# Patient Record
Sex: MALE | Race: WHITE | Employment: OTHER | URBAN - METROPOLITAN AREA
[De-identification: names, ages, dates, MRNs, and addresses within clinical notes are randomized per-mention and may not be internally consistent; named-entity substitution may affect disease eponyms.]

---

## 2017-01-13 ENCOUNTER — LAB ENCOUNTER (OUTPATIENT)
Dept: LAB | Age: 82
End: 2017-01-13
Attending: INTERNAL MEDICINE
Payer: MEDICARE

## 2017-01-13 DIAGNOSIS — R41.3 MEMORY LOSS: ICD-10-CM

## 2017-01-13 LAB
ALBUMIN SERPL BCP-MCNC: 3.8 G/DL (ref 3.5–4.8)
ALBUMIN/GLOB SERPL: 1.3 {RATIO} (ref 1–2)
ALP SERPL-CCNC: 79 U/L (ref 32–100)
ALT SERPL-CCNC: 13 U/L (ref 17–63)
ANION GAP SERPL CALC-SCNC: 7 MMOL/L (ref 0–18)
AST SERPL-CCNC: 14 U/L (ref 15–41)
BASOPHILS # BLD: 0.1 K/UL (ref 0–0.2)
BASOPHILS NFR BLD: 1 %
BILIRUB SERPL-MCNC: 0.7 MG/DL (ref 0.3–1.2)
BUN SERPL-MCNC: 40 MG/DL (ref 8–20)
BUN/CREAT SERPL: 16.6 (ref 10–20)
CALCIUM SERPL-MCNC: 9.1 MG/DL (ref 8.5–10.5)
CHLORIDE SERPL-SCNC: 108 MMOL/L (ref 95–110)
CO2 SERPL-SCNC: 25 MMOL/L (ref 22–32)
CREAT SERPL-MCNC: 2.41 MG/DL (ref 0.5–1.5)
EOSINOPHIL # BLD: 0.3 K/UL (ref 0–0.7)
EOSINOPHIL NFR BLD: 3 %
ERYTHROCYTE [DISTWIDTH] IN BLOOD BY AUTOMATED COUNT: 14.8 % (ref 11–15)
GLOBULIN PLAS-MCNC: 2.9 G/DL (ref 2.5–3.7)
GLUCOSE SERPL-MCNC: 99 MG/DL (ref 70–99)
HCT VFR BLD AUTO: 40.8 % (ref 41–52)
HGB BLD-MCNC: 13.2 G/DL (ref 13.5–17.5)
LYMPHOCYTES # BLD: 1.5 K/UL (ref 1–4)
LYMPHOCYTES NFR BLD: 18 %
MCH RBC QN AUTO: 29 PG (ref 27–32)
MCHC RBC AUTO-ENTMCNC: 32.4 G/DL (ref 32–37)
MCV RBC AUTO: 89.6 FL (ref 80–100)
MONOCYTES # BLD: 0.4 K/UL (ref 0–1)
MONOCYTES NFR BLD: 5 %
NEUTROPHILS # BLD AUTO: 6.2 K/UL (ref 1.8–7.7)
NEUTROPHILS NFR BLD: 73 %
OSMOLALITY UR CALC.SUM OF ELEC: 300 MOSM/KG (ref 275–295)
PLATELET # BLD AUTO: 199 K/UL (ref 140–400)
PMV BLD AUTO: 11 FL (ref 7.4–10.3)
POTASSIUM SERPL-SCNC: 4.2 MMOL/L (ref 3.3–5.1)
PROT SERPL-MCNC: 6.7 G/DL (ref 5.9–8.4)
RBC # BLD AUTO: 4.55 M/UL (ref 4.5–5.9)
SODIUM SERPL-SCNC: 140 MMOL/L (ref 136–144)
TSH SERPL-ACNC: 1.88 UIU/ML (ref 0.34–5.6)
VIT B12 SERPL-MCNC: 257 PG/ML (ref 181–914)
WBC # BLD AUTO: 8.5 K/UL (ref 4–11)

## 2017-01-13 PROCEDURE — 85025 COMPLETE CBC W/AUTO DIFF WBC: CPT

## 2017-01-13 PROCEDURE — 84443 ASSAY THYROID STIM HORMONE: CPT

## 2017-01-13 PROCEDURE — 82607 VITAMIN B-12: CPT

## 2017-01-13 PROCEDURE — 80053 COMPREHEN METABOLIC PANEL: CPT

## 2017-01-13 PROCEDURE — 36415 COLL VENOUS BLD VENIPUNCTURE: CPT

## 2017-01-19 ENCOUNTER — TELEPHONE (OUTPATIENT)
Dept: INTERNAL MEDICINE CLINIC | Facility: CLINIC | Age: 82
End: 2017-01-19

## 2017-01-19 DIAGNOSIS — N28.9 FUNCTION KIDNEY DECREASED: Primary | ICD-10-CM

## 2017-01-19 NOTE — TELEPHONE ENCOUNTER
Call got disconnected, unable to get through at this time. RN f/u tomorrow.  Renal US entered in epic

## 2017-01-19 NOTE — TELEPHONE ENCOUNTER
----- Message from Latonya Kearney MD sent at 1/14/2017  2:05 PM CST -----  Okay except kidney function markedly deteriorated since last time. Need renal ultrasound. Further follow-up based on results of testing.

## 2017-01-21 RX ORDER — ATORVASTATIN CALCIUM 20 MG/1
TABLET, FILM COATED ORAL
Qty: 90 TABLET | Refills: 0 | Status: SHIPPED | OUTPATIENT
Start: 2017-01-21 | End: 2017-04-20

## 2017-01-21 NOTE — TELEPHONE ENCOUNTER
Please advise on refill request    No lipid panel.     Cholesterol Medications  Protocol Criteria:  · Appointment scheduled in the past 12 months or in the next 3 months  · ALT & LDL on file in the past 12 months  · ALT result < 80  · LDL result <130   Rece

## 2017-01-23 NOTE — TELEPHONE ENCOUNTER
Pharmacy      [facility][id number] - [address] [phone], [phone]       Medication Detail         Disp Refills Start End        Atorvastatin Calcium 20 MG Oral Tab 90 tablet 0 1/21/2017       Sig: TAKE 1 TABLET (20MG)

## 2017-01-23 NOTE — TELEPHONE ENCOUNTER
Advised patient of Dr. Jeremías Porter note. Patient verbalized understanding. Number to scheduling given to patient to schedule ultrasound.

## 2017-01-30 ENCOUNTER — TELEPHONE (OUTPATIENT)
Dept: INTERNAL MEDICINE CLINIC | Facility: CLINIC | Age: 82
End: 2017-01-30

## 2017-01-30 RX ORDER — LISINOPRIL 10 MG/1
TABLET ORAL
Qty: 90 TABLET | Refills: 0 | Status: SHIPPED | OUTPATIENT
Start: 2017-01-30 | End: 2017-05-01

## 2017-01-30 NOTE — TELEPHONE ENCOUNTER
Hypertensive Medications: Medication refilled for 90 days per protocol.   Abnormal result reviewed with patient by PCP    Protocol Criteria:  · Appointment scheduled in the past 6 months or in the next 3 months  · BMP or CMP in the past 12 months  · Creatin

## 2017-01-30 NOTE — TELEPHONE ENCOUNTER
Son calling regarding medication lisinopril (PRINIVIL,ZESTRIL) 10 MG Oral Tab he state that he put in  The request two weeks ago and pharmacy has advised that they have sent it. Son is upset . Yanni Master Yanni Amos please advise pt is out of medication       Current Outpatien

## 2017-02-02 ENCOUNTER — HOSPITAL ENCOUNTER (OUTPATIENT)
Dept: ULTRASOUND IMAGING | Age: 82
Discharge: HOME | End: 2017-02-02
Attending: INTERNAL MEDICINE
Payer: MEDICARE

## 2017-02-02 DIAGNOSIS — N28.9 FUNCTION KIDNEY DECREASED: ICD-10-CM

## 2017-02-02 PROCEDURE — 76770 US EXAM ABDO BACK WALL COMP: CPT

## 2017-02-03 ENCOUNTER — TELEPHONE (OUTPATIENT)
Dept: INTERNAL MEDICINE CLINIC | Facility: CLINIC | Age: 82
End: 2017-02-03

## 2017-02-03 NOTE — TELEPHONE ENCOUNTER
Notes Recorded by Cathleen Herrera RN on 2/3/2017 at 10:52 AM  Called pt to discuss the renal ultrasound results.  [facility]    ----- Message from Fausto Rivers MD sent at 2/2/2017  1:55 PM CST -----  Renal ultrasound basically ok

## 2017-02-04 NOTE — TELEPHONE ENCOUNTER
Spoke with patient (identified name and ), results reviewed and agrees with plan. Phone number given for Dr. Imelda Bautista.

## 2017-02-17 ENCOUNTER — TELEPHONE (OUTPATIENT)
Dept: NEUROLOGY | Facility: CLINIC | Age: 82
End: 2017-02-17

## 2017-02-21 NOTE — TELEPHONE ENCOUNTER
Spoke with patient and he stated that he would have to call us back to reschedule after looking at his calendar but understood that we have to cancel his appointment for 02/22/2017 with Dr Geoffrey Stephens, patient thanked me for my call

## 2017-03-22 ENCOUNTER — TELEPHONE (OUTPATIENT)
Dept: NEUROLOGY | Facility: CLINIC | Age: 82
End: 2017-03-22

## 2017-03-23 ENCOUNTER — OFFICE VISIT (OUTPATIENT)
Dept: NEUROLOGY | Facility: CLINIC | Age: 82
End: 2017-03-23

## 2017-03-23 VITALS
SYSTOLIC BLOOD PRESSURE: 118 MMHG | OXYGEN SATURATION: 97 % | HEART RATE: 69 BPM | BODY MASS INDEX: 27.58 KG/M2 | DIASTOLIC BLOOD PRESSURE: 74 MMHG | WEIGHT: 197 LBS | HEIGHT: 71 IN

## 2017-03-23 DIAGNOSIS — G30.1 LATE ONSET ALZHEIMER'S DISEASE WITHOUT BEHAVIORAL DISTURBANCE (HCC): ICD-10-CM

## 2017-03-23 DIAGNOSIS — F02.80 LATE ONSET ALZHEIMER'S DISEASE WITHOUT BEHAVIORAL DISTURBANCE (HCC): ICD-10-CM

## 2017-03-23 DIAGNOSIS — G93.40 ENCEPHALOPATHY: Primary | ICD-10-CM

## 2017-03-23 PROCEDURE — 99204 OFFICE O/P NEW MOD 45 MIN: CPT | Performed by: OTHER

## 2017-03-23 RX ORDER — DONEPEZIL HYDROCHLORIDE 5 MG/1
5 TABLET, FILM COATED ORAL NIGHTLY
Qty: 30 TABLET | Refills: 3 | Status: SHIPPED | OUTPATIENT
Start: 2017-03-23 | End: 2017-10-26

## 2017-03-23 NOTE — PATIENT INSTRUCTIONS
Refill policies:    • Allow 2 business days for refills; controlled substances may take longer. • Contact our office at least 5 days prior to running out of medication or submit request through the “request refill” option in your [employer] account.   • Ref have a procedure or additional testing performed. [facility]) will contact your insurance carrier to obtain pre-certification or prior authorization.     Unfortunately, [facility] has seen an increase in denial of payment even though the p

## 2017-03-23 NOTE — PROGRESS NOTES
Michael Leung : 2/10/1931     HPI:   Patient presents with:  Memory Loss: Son and Daughter accompanies patient. New patient referred by . Pt c/o lack of short-term memory. Sx started about 7 years ago,but has worsen within the last 3-2 years.  Ct furosemide 40 MG Oral Tab Take 1 tablet (40 mg total) by mouth daily. Disp: 30 tablet Rfl: 2   ClonazePAM 0.5 MG Oral Tab Take 1 tablet (0.5 mg total) by mouth 2 (two) times daily as needed for Anxiety.  Disp: 20 tablet Rfl: 0   acetaminophen (TYLENOL) 32 118/74 mmHg  Pulse 69  Ht 71\"  Wt 197 lb  BMI 27.49 kg/m2  SpO2 97% . Blood pressure is equal in both arms. General appearance: In no distress. Good range of motion of his neck. CV: No  Evidence of Carotid Bruits, Regular Rate and Rhythm.    Respirator possibility there also is a vascular component to his dementia. Recent thyroid and B12 studies were both normal.    I discussed the differential diagnosis with the patient, his son, and daughter.   I recommended adding 81 mg aspirin to his statins, for str

## 2017-04-20 RX ORDER — ATORVASTATIN CALCIUM 20 MG/1
TABLET, FILM COATED ORAL
Qty: 90 TABLET | Refills: 0 | Status: SHIPPED | OUTPATIENT
Start: 2017-04-20 | End: 2017-07-31

## 2017-04-20 NOTE — TELEPHONE ENCOUNTER
Dr. Alfredo Fajardo please advise. 90 day med pended for your review.  Cannot refill because no LDL on file

## 2017-05-01 RX ORDER — LISINOPRIL 10 MG/1
TABLET ORAL
Qty: 90 TABLET | Refills: 0 | Status: SHIPPED | OUTPATIENT
Start: 2017-05-01 | End: 2017-07-31

## 2017-06-19 RX ORDER — FUROSEMIDE 40 MG/1
TABLET ORAL
Qty: 30 TABLET | Refills: 1 | Status: SHIPPED | OUTPATIENT
Start: 2017-06-19 | End: 2017-08-15

## 2017-07-31 RX ORDER — LISINOPRIL 10 MG/1
TABLET ORAL
Qty: 90 TABLET | Refills: 0 | Status: SHIPPED | OUTPATIENT
Start: 2017-07-31 | End: 2017-10-31

## 2017-07-31 RX ORDER — ATORVASTATIN CALCIUM 20 MG/1
TABLET, FILM COATED ORAL
Qty: 90 TABLET | Refills: 0 | Status: SHIPPED | OUTPATIENT
Start: 2017-07-31 | End: 2017-10-31

## 2017-08-15 ENCOUNTER — TELEPHONE (OUTPATIENT)
Dept: INTERNAL MEDICINE CLINIC | Facility: CLINIC | Age: 82
End: 2017-08-15

## 2017-08-15 RX ORDER — FUROSEMIDE 40 MG/1
TABLET ORAL
Qty: 30 TABLET | Refills: 0 | Status: SHIPPED | OUTPATIENT
Start: 2017-08-15 | End: 2019-03-25

## 2017-08-15 NOTE — TELEPHONE ENCOUNTER
Per pharmacy they don't have FUROSEMIDE 40 mg and would like to know if they can issue 20mg 2tabs a day ?

## 2017-08-17 NOTE — TELEPHONE ENCOUNTER
Kaitlin pharmacist ok to dispense 20mg furosemide to take 2 tablets once daily, since they did not have the Furosemide 40mg in stock, but advised to clearly explain to patient when he picks up the medication, so he understands.  Pharmacist verbal

## 2017-08-21 RX ORDER — FUROSEMIDE 40 MG/1
TABLET ORAL
Qty: 30 TABLET | Refills: 0 | Status: SHIPPED | OUTPATIENT
Start: 2017-08-21 | End: 2017-10-23

## 2017-08-21 RX ORDER — IPRATROPIUM/ALBUTEROL SULFATE 20-100 MCG
MIST INHALER (GRAM) INHALATION
Qty: 12 G | Refills: 0 | Status: SHIPPED | OUTPATIENT
Start: 2017-08-21 | End: 2017-08-21

## 2017-08-21 RX ORDER — IPRATROPIUM/ALBUTEROL SULFATE 20-100 MCG
MIST INHALER (GRAM) INHALATION
Qty: 12 G | Refills: 0 | Status: SHIPPED | OUTPATIENT
Start: 2017-08-21

## 2017-10-23 RX ORDER — FUROSEMIDE 40 MG/1
TABLET ORAL
Qty: 30 TABLET | Refills: 2 | Status: SHIPPED | OUTPATIENT
Start: 2017-10-23 | End: 2017-10-26

## 2017-10-23 NOTE — TELEPHONE ENCOUNTER
Please advise on refill request.  Any lab orders? Message left for patient to call back to schedule an appointment with the doctor.     Hypertensive Medications  Protocol Criteria:  · Appointment scheduled in the past 6 months or in the next 3 months  · BM

## 2017-10-26 ENCOUNTER — OFFICE VISIT (OUTPATIENT)
Dept: INTERNAL MEDICINE CLINIC | Facility: CLINIC | Age: 82
End: 2017-10-26

## 2017-10-26 VITALS
WEIGHT: 185.19 LBS | SYSTOLIC BLOOD PRESSURE: 135 MMHG | HEART RATE: 76 BPM | BODY MASS INDEX: 26 KG/M2 | DIASTOLIC BLOOD PRESSURE: 76 MMHG

## 2017-10-26 DIAGNOSIS — F03.90 DEMENTIA WITHOUT BEHAVIORAL DISTURBANCE, UNSPECIFIED DEMENTIA TYPE (HCC): ICD-10-CM

## 2017-10-26 DIAGNOSIS — F41.9 ANXIETY: ICD-10-CM

## 2017-10-26 DIAGNOSIS — I50.9 CHRONIC HEART FAILURE, UNSPECIFIED HEART FAILURE TYPE (HCC): Primary | ICD-10-CM

## 2017-10-26 PROCEDURE — 99214 OFFICE O/P EST MOD 30 MIN: CPT | Performed by: INTERNAL MEDICINE

## 2017-10-26 PROCEDURE — G0463 HOSPITAL OUTPT CLINIC VISIT: HCPCS | Performed by: INTERNAL MEDICINE

## 2017-10-26 RX ORDER — MIRTAZAPINE 15 MG/1
15 TABLET, FILM COATED ORAL NIGHTLY
Qty: 90 TABLET | Refills: 1 | Status: SHIPPED | OUTPATIENT
Start: 2017-10-26

## 2017-10-26 RX ORDER — ALBUTEROL SULFATE 90 UG/1
2 AEROSOL, METERED RESPIRATORY (INHALATION) EVERY 4 HOURS PRN
Qty: 1 INHALER | Refills: 6 | Status: SHIPPED | OUTPATIENT
Start: 2017-10-26 | End: 2018-10-16

## 2017-10-26 NOTE — PROGRESS NOTES
HPI:    Patient ID: Davion Stanley is a 80year old male. Heart Problem   This is a chronic problem. The current episode started more than 1 year ago. The problem occurs daily. The problem has been unchanged.  Pertinent negatives include no chest pain, ch MG Oral Tab Take 1 tablet (15 mg total) by mouth nightly. Disp: 90 tablet Rfl: 1   Albuterol Sulfate HFA (PROAIR HFA) 108 (90 Base) MCG/ACT Inhalation Aero Soln Inhale 2 puffs into the lungs every 4 (four) hours as needed for Wheezing.  Disp: 1 Inhaler Rfl: alert.   Skin: Skin is dry. Psychiatric: His behavior is normal.   Nursing note and vitals reviewed. 10/26/17  1502   BP: 135/76   Pulse: 76   Weight: 185 lb 3.2 oz (84 kg)         Body mass index is 25.83 kg/m².     Diabetes:  No results found for: Electronically Signed: Nay Solis, 10/26/2017, 2:53 PM.    TU ARTEAGA MD,  personally performed the services described in this documentation. All medical record entries made by the scribe were at my direction and in my presence.   I have r

## 2017-10-31 RX ORDER — LISINOPRIL 10 MG/1
TABLET ORAL
Qty: 90 TABLET | Refills: 0 | Status: SHIPPED | OUTPATIENT
Start: 2017-10-31 | End: 2018-01-29

## 2017-10-31 RX ORDER — ATORVASTATIN CALCIUM 20 MG/1
TABLET, FILM COATED ORAL
Qty: 90 TABLET | Refills: 0 | Status: SHIPPED | OUTPATIENT
Start: 2017-10-31 | End: 2018-01-29

## 2018-01-19 RX ORDER — FUROSEMIDE 40 MG/1
TABLET ORAL
Qty: 30 TABLET | Refills: 1 | Status: SHIPPED | OUTPATIENT
Start: 2018-01-19 | End: 2018-03-22

## 2018-01-29 RX ORDER — LISINOPRIL 10 MG/1
TABLET ORAL
Qty: 90 TABLET | Refills: 0 | Status: SHIPPED | OUTPATIENT
Start: 2018-01-29 | End: 2018-05-01

## 2018-01-29 RX ORDER — ATORVASTATIN CALCIUM 20 MG/1
TABLET, FILM COATED ORAL
Qty: 90 TABLET | Refills: 0 | Status: SHIPPED | OUTPATIENT
Start: 2018-01-29

## 2018-03-22 RX ORDER — FUROSEMIDE 40 MG/1
TABLET ORAL
Qty: 30 TABLET | Refills: 0 | Status: SHIPPED | OUTPATIENT
Start: 2018-03-22 | End: 2018-05-01

## 2018-03-22 NOTE — TELEPHONE ENCOUNTER
REFILL FAILED PROTOCOL    Creatinine greater than 2 - 2.41    Labs over 15 months old.     LOV 10/26/17    Please advise

## 2018-03-22 NOTE — TELEPHONE ENCOUNTER
Hypertensive Medications  Protocol Criteria:  · Appointment scheduled in the past 6 months or in the next 3 months  · BMP or CMP in the past 12 months  · Creatinine result < 2  Recent Outpatient Visits            4 months ago Chronic heart failure, unspeci

## 2018-04-30 NOTE — TELEPHONE ENCOUNTER
Ramiro/son [phone] is calling for status of medication refill request. Per son pt is out of medication.

## 2018-04-30 NOTE — TELEPHONE ENCOUNTER
ivky/son [phone] is calling for status of medication refill request. Per son pt is out of medication.

## 2018-05-01 RX ORDER — LISINOPRIL 10 MG/1
TABLET ORAL
Qty: 90 TABLET | Refills: 1 | Status: SHIPPED | OUTPATIENT
Start: 2018-05-01 | End: 2018-10-23

## 2018-05-01 RX ORDER — FUROSEMIDE 40 MG/1
TABLET ORAL
Qty: 90 TABLET | Refills: 1 | Status: SHIPPED | OUTPATIENT
Start: 2018-05-01 | End: 2018-10-23

## 2018-10-16 NOTE — PROGRESS NOTES
HPI:    Patient ID: Alysa Gonzalez is a 80year old male. Pt's son and caregiver give history. Son states that they would like an order for home health nurse    Heart Problem   This is a chronic problem. The current episode started more than 1 year ago. occurs constantly. The problem has been waxing and waning. Pertinent negatives include no abdominal pain or chest pain. Treatments tried: pt's son requests DME order for walker and shower chair.    Change of Bowel Habits   The problem has been waxing and wa nightly.  Disp: 90 tablet Rfl: 1   COMBIVENT RESPIMAT  MCG/ACT Inhalation Aero Soln INHALE 1 PUFF BY INHALATION ROUTE 4 TIMES PER DAY Disp: 12 g Rfl: 0   FUROSEMIDE 40 MG Oral Tab TAKE ONE TABLET BY MOUTH ONE TIME DAILY  Disp: 30 tablet Rfl: 0   aspir Results   Component Value Date    AST 14 (L) 01/13/2017     Lab Results   Component Value Date    ALT 13 (L) 01/13/2017            ASSESSMENT/PLAN:   Dementia due to alzheimer's disease  (primary encounter diagnosis)  Chronic obstructive pulmonary disease, reviewed the chart and discharge instructions (if applicable) and agree that the record reflects my personal performance and is accurate and complete.   Charleen Arriaza MD, 10/18/2018, 1:24 PM

## 2018-10-25 RX ORDER — FUROSEMIDE 40 MG/1
TABLET ORAL
Qty: 90 TABLET | Refills: 0 | Status: SHIPPED | OUTPATIENT
Start: 2018-10-25 | End: 2019-03-25

## 2018-10-25 RX ORDER — LISINOPRIL 10 MG/1
TABLET ORAL
Qty: 90 TABLET | Refills: 0 | Status: SHIPPED | OUTPATIENT
Start: 2018-10-25 | End: 2019-01-23

## 2018-10-25 RX ORDER — FUROSEMIDE 40 MG/1
TABLET ORAL
Qty: 90 TABLET | Refills: 0 | Status: SHIPPED | OUTPATIENT
Start: 2018-10-25 | End: 2019-01-23

## 2018-10-25 RX ORDER — LISINOPRIL 10 MG/1
TABLET ORAL
Qty: 90 TABLET | Refills: 0 | Status: SHIPPED | OUTPATIENT
Start: 2018-10-25 | End: 2019-03-26

## 2018-11-08 ENCOUNTER — TELEPHONE (OUTPATIENT)
Dept: INTERNAL MEDICINE CLINIC | Facility: CLINIC | Age: 83
End: 2018-11-08

## 2018-11-08 NOTE — TELEPHONE ENCOUNTER
Per pt's son he was advised the order for [place] was sent to the wrong place. Requesting it to be sent to [facility].

## 2018-12-03 ENCOUNTER — TELEPHONE (OUTPATIENT)
Dept: OTHER | Age: 83
End: 2018-12-03

## 2018-12-03 NOTE — TELEPHONE ENCOUNTER
Spoke with son  Mimi Delgadillo (MARQUIS verified) and is upset because [address] has not yet been started for patient. \"I don't like being played around with--it has been over a month and a half since this was ordered.     He is requesting orders be sent again to Res

## 2018-12-03 NOTE — TELEPHONE ENCOUNTER
Dontetiti Ballesterosshara (son) called back. Relayed Sandras message below. Pt verbalized understanding and did not have any further questions at this time. Will just await Dr. Kenyon Vann advice regarding Trazadone from 12/3 encounter and call son back with advice.

## 2018-12-03 NOTE — TELEPHONE ENCOUNTER
Lmtcb, let pt's son know referral was refaxed to [facility] at [phone] he can go ahead and contact them if he would like Gary Acosta ([phone].  I did include for viky to be contact in order to initiate hh.

## 2018-12-03 NOTE — TELEPHONE ENCOUNTER
Spoke with son Faith Elias (MARQUIS verified)--states Trazadone is not helping patient--\"I'm asking Dr. Mimi Sherwood to increase his Trazadone to twice a day--my father hasn't been sleeping well for months. He's got severe dementia and he needs some relief. \"    Please als

## 2018-12-03 NOTE — TELEPHONE ENCOUNTER
Spoke to Byron, son. Informed ok to increase trazadone to 100mg nightly. Patient still has 50mg tablets and will call back to update if 100mg is helping him and will then ask for a new prescription.

## 2018-12-07 NOTE — TELEPHONE ENCOUNTER
Meenakshi Gomez, [facility] RN calling and states that she admitted the patient today, will need admission orders include PT/OT and speech therapist, also will need diagnosis, fax  Number [phone], advised that will send the message to the office and will fax the neede

## 2018-12-11 ENCOUNTER — TELEPHONE (OUTPATIENT)
Dept: INTERNAL MEDICINE CLINIC | Facility: CLINIC | Age: 83
End: 2018-12-11

## 2018-12-11 NOTE — TELEPHONE ENCOUNTER
[place] speech therapist called stts she just needs a verbal from Dr. Serge Corrigan to move appt for Eval to next week. Family has to many appts this week. Per Dr. Luis Ramirez verbal ok, [place] informed and voiced understanding.

## 2018-12-24 ENCOUNTER — TELEPHONE (OUTPATIENT)
Dept: OTHER | Age: 83
End: 2018-12-24

## 2018-12-24 NOTE — TELEPHONE ENCOUNTER
Desirae [address] pharmacist, [address] , calling and states that Cirilo Joel RN phoned in the prescription earlier but when he checked the OSMEL  it  keep saying INACTIVE. OSMEL number given again but states that it is the same number that he has.  Informed Stalin Holt, Supervisor about

## 2018-12-24 NOTE — TELEPHONE ENCOUNTER
Pt's son asking for another medication for pt. States Trazodone is not relieving pt's symptoms of anxiety and pt is sleeping about 4 hours a night, sometimes less.  States pt started taking Trazodone 50 mg in November, med was increased to Trazodone 50 mg 2

## 2018-12-24 NOTE — TELEPHONE ENCOUNTER
Patient's son states Trazadone 100 mg  is not helping. Patient has  more anxiety and sleeps only 4 hours nightly or less. Would like to try something else please. Needs Rx today. Please advise.

## 2018-12-24 NOTE — TELEPHONE ENCOUNTER
Patient's son contacted and message given. Rx called to pharmacy. Dr. Mimi Sherwood please sign off.

## 2018-12-24 NOTE — TELEPHONE ENCOUNTER
Contacted pharmacist;  rx went through. Left message for pateint's son that new medication was prescribed. Should be at the [place] for .

## 2018-12-26 ENCOUNTER — NURSE TRIAGE (OUTPATIENT)
Dept: INTERNAL MEDICINE CLINIC | Facility: CLINIC | Age: 83
End: 2018-12-26

## 2018-12-26 NOTE — TELEPHONE ENCOUNTER
Action Requested: Summary for Provider     []  Critical Lab, Recommendations Needed  [] Need Additional Advice  []   FYI    []   Need Orders  [x] Need Medications Sent to Pharmacy  []  Other     SUMMARY: Patient's son calling to request if patient can have

## 2018-12-27 RX ORDER — PAROXETINE 10 MG/1
10 TABLET, FILM COATED ORAL NIGHTLY
Qty: 30 TABLET | Refills: 1 | Status: SHIPPED | OUTPATIENT
Start: 2018-12-27 | End: 2019-01-08

## 2018-12-27 NOTE — TELEPHONE ENCOUNTER
Yes, Paxil is okay to take with his medication. I suggest taking Paxil evening because it can be be sedating. However, he can take it anytime during the day.

## 2018-12-27 NOTE — TELEPHONE ENCOUNTER
Called patient's son Mimi Delgadillo. Informed him of Dr. Marletta Bernheim advice. Before Rx is sent Mimi Delgadillo has a few questions for the doctor:    Is it safe to take Paxil with his current Rx of zolpidem 2.5 mg nightly?   Can pt take paxil in the mornings because patient is mos

## 2018-12-27 NOTE — TELEPHONE ENCOUNTER
Chuy. Informed him of Dr. Dubose Flick answers below. He verbalized his understanding. Rx sent as written.

## 2019-01-02 ENCOUNTER — TELEPHONE (OUTPATIENT)
Dept: INTERNAL MEDICINE CLINIC | Facility: CLINIC | Age: 84
End: 2019-01-02

## 2019-01-02 NOTE — TELEPHONE ENCOUNTER
Shirley [facility] PT stated pt declined PT services after EVAL    Shirley stated someone told him pt seems to be overwhelemed with the amount of people coming in    Please advise    Shirley/[facility] [employer]  [phone]

## 2019-01-03 ENCOUNTER — TELEPHONE (OUTPATIENT)
Dept: INTERNAL MEDICINE CLINIC | Facility: CLINIC | Age: 84
End: 2019-01-03

## 2019-01-03 RX ORDER — ZOLPIDEM TARTRATE 5 MG/1
TABLET ORAL
Qty: 30 TABLET | Refills: 0 | OUTPATIENT
Start: 2019-01-03

## 2019-01-03 NOTE — TELEPHONE ENCOUNTER
Per son of pt, he would like to talk to Dr Zulma Bautista or RN in regards to pt rx med Ambien and PARoxetine HCl (PAXIL), Son of pt stts that it's not working much with pt and would like to know if pt can double the dose. Pls advise.

## 2019-01-03 NOTE — TELEPHONE ENCOUNTER
Per Pyreos ( HIPPA verified) Pt has been up and down through out the nigth in the last 48 hrs. Would like for Dr Kendall Denver to increased dosage. Please advise.

## 2019-01-04 ENCOUNTER — TELEPHONE (OUTPATIENT)
Dept: INTERNAL MEDICINE CLINIC | Facility: CLINIC | Age: 84
End: 2019-01-04

## 2019-01-04 NOTE — TELEPHONE ENCOUNTER
Felicia Wells from [facility] calling to request a verbal order to see pt one more time next week for occupational therapy.  Please advise

## 2019-01-05 NOTE — TELEPHONE ENCOUNTER
Spoke with Shirley and informed him of Dr. Shaye Melendez note below and He voiced understanding. Shirley will contact pt again.

## 2019-01-07 ENCOUNTER — TELEPHONE (OUTPATIENT)
Dept: INTERNAL MEDICINE CLINIC | Facility: CLINIC | Age: 84
End: 2019-01-07

## 2019-01-07 NOTE — TELEPHONE ENCOUNTER
Patient's son called again very upset. Requesting response from Dr. Wandy Jules. Patient is not sleeping and is up for 24 hours per day. Has dementia. Wondering if he can increase dose of paxil and/or ambien. Would like a call back at [phone].

## 2019-01-07 NOTE — TELEPHONE ENCOUNTER
Patient's son again calling with question about sleep meds. Patient is up every night, at one time staying awake for 48 hours, he has been trying to get an answer since last week.  See note below, can he change dose of paxil and/or ambien  Please reply to pool: EM RN TRIAGE  Reason for call changed from medication question to acute

## 2019-01-08 RX ORDER — PAROXETINE HYDROCHLORIDE 20 MG/1
20 TABLET, FILM COATED ORAL NIGHTLY
Qty: 30 TABLET | Refills: 0 | Status: SHIPPED | OUTPATIENT
Start: 2019-01-08 | End: 2019-02-10

## 2019-01-08 NOTE — TELEPHONE ENCOUNTER
CSS or RN: please transfer call to me at [phone] if Adan Raymundo or [facility] calls back. I would like to speak with [facility] RN.

## 2019-01-08 NOTE — TELEPHONE ENCOUNTER
Received a call from [facility], occupational therapist from [place]  who wanted to check on the status of the request from below. Routed to provider for review.     Please respond to pool: JOSE SEYMOUR LPN/VALDEMAR

## 2019-01-08 NOTE — TELEPHONE ENCOUNTER
Incoming call from Byron, son who is upset. I gave Dr Bird Cluster directions for paxil increase. He states he has been giving patient ambien at night and it doesn't work. Patient has frequent falls because patient is too tired and doesn't sleep well at night. He felt no one is helping him. Byron also feels tired and he is not sleeping due to caring for patient. I placed a call to Cathleen Xiong, [facility] RN to inform her of increase on Paxil. I would also like to discuss Ramiro's concern regarding his falls, and sleeping habits, dementia.

## 2019-01-09 NOTE — TELEPHONE ENCOUNTER
Spoke with Dolly Thomas OT--following up on 3rd request for TCW to authorize one more OT visit for patient---needs to schedule OT visit tomorrow, as she will not be available the rest of the week.      Please advise    If you reach Dulce's voicemail--ok to l

## 2019-01-09 NOTE — TELEPHONE ENCOUNTER
Spoke with Gianna Reyes and relayed EL message below--patient verbalizes understanding and agreement. No further questions/concerns at this time.

## 2019-01-11 ENCOUNTER — NURSE TRIAGE (OUTPATIENT)
Dept: INTERNAL MEDICINE CLINIC | Facility: CLINIC | Age: 84
End: 2019-01-11

## 2019-01-11 NOTE — TELEPHONE ENCOUNTER
Pt's son is calling to inquire on the medications listed below. Son states these two medications are not working for pt. Son states they are not doing anything for pt and would like a call back to see if the dosage can be changed.        Please advise thank

## 2019-01-11 NOTE — TELEPHONE ENCOUNTER
Jason Ivan home health nurse also contacted clinic reporting the same info below. Routed to provider for advisement.

## 2019-01-11 NOTE — TELEPHONE ENCOUNTER
Attempt made to contact Doctor January Tobar at [address]; no answer LMTCB.  please note that the patient's son reported the Ambien is not helping the patient. Routed to provider.

## 2019-01-11 NOTE — TELEPHONE ENCOUNTER
Pt's son called to follow up on message below. States pt has not slept and current medications are not helping. Please advise.

## 2019-01-14 NOTE — TELEPHONE ENCOUNTER
Son called back regarding medication increase. Per son, patient is taking Paroxetine 20 mg daily and it is not working per son. Patient also taking Zolpidem 5 mg (1/2 tab) nightly and it is not helping with his sleep.      Son in requesting an increase i

## 2019-01-14 NOTE — TELEPHONE ENCOUNTER
Action Requested: Summary for Provider     []  Critical Lab, Recommendations Needed  [] Need Additional Advice  []   FYI    []   Need Orders  [] Need Medications Sent to Pharmacy  []  Other     SUMMARY:   Please advise.   The son is very upset that no one i

## 2019-01-14 NOTE — TELEPHONE ENCOUNTER
Please let the patient know that  Rather his son that is why he is able to have full 5 mg Ambien at night. Gabo Tinajero

## 2019-01-14 NOTE — TELEPHONE ENCOUNTER
LMTCB  Transfer to triage. Please see 1/3/19 acute call. Was addressed.     New Akins MD          1:25 PM   Note      On  1/3 I infrased                 paxil to 29mg and  ambien to      5    mg            Paxil was increased to 20 mg daily and Zo

## 2019-01-15 NOTE — TELEPHONE ENCOUNTER
Pt son calling stating the increase in Ambien and Paxil has not helped. Per Dr. Jose Prather note below I advised son to stop the Ambien 5mg and start the new medication Dr. Marcela Shrestha advised (Belsomra 5mg) instead. Dr. Marcela Shrestha,      Please sign pended script.

## 2019-01-23 ENCOUNTER — TELEPHONE (OUTPATIENT)
Dept: INTERNAL MEDICINE CLINIC | Facility: CLINIC | Age: 84
End: 2019-01-23

## 2019-01-23 RX ORDER — LISINOPRIL 10 MG/1
TABLET ORAL
Qty: 90 TABLET | Refills: 0 | Status: SHIPPED | OUTPATIENT
Start: 2019-01-23

## 2019-01-23 RX ORDER — FUROSEMIDE 40 MG/1
TABLET ORAL
Qty: 90 TABLET | Refills: 0 | Status: SHIPPED | OUTPATIENT
Start: 2019-01-23

## 2019-01-23 NOTE — TELEPHONE ENCOUNTER
[facility] would like to inform Dr. Melvin Salazar that they are re certifying the patient for nursing visit once a week x 8 weeks and she will fax the order for the doctor to sign.

## 2019-02-10 RX ORDER — PAROXETINE HYDROCHLORIDE 20 MG/1
20 TABLET, FILM COATED ORAL NIGHTLY
Qty: 30 TABLET | Refills: 0 | Status: SHIPPED | OUTPATIENT
Start: 2019-02-10 | End: 2019-03-25

## 2019-02-11 RX ORDER — PAROXETINE HYDROCHLORIDE 20 MG/1
20 TABLET, FILM COATED ORAL NIGHTLY
Qty: 30 TABLET | Refills: 2 | Status: SHIPPED | OUTPATIENT
Start: 2019-02-11 | End: 2019-06-01

## 2019-02-12 ENCOUNTER — TELEPHONE (OUTPATIENT)
Dept: OTHER | Age: 84
End: 2019-02-12

## 2019-02-12 DIAGNOSIS — G47.9 SLEEP DISORDER: Primary | ICD-10-CM

## 2019-02-12 NOTE — TELEPHONE ENCOUNTER
Spoke with son Ramiro--reports Paxil and Belsomra not working--\"It's causing the reverse effect on him. He becomes verbally loud and angry when I give him the [address]. The Paxil doesn't work--I give it to him, then at one in the morning, he is wide awake.

## 2019-02-12 NOTE — TELEPHONE ENCOUNTER
Refill passed per [facility] protocol.   Refill Protocol Appointment Criteria  · Appointment scheduled in the past 6 months or in the next 3 months  Recent Outpatient Visits            3 months ago Dementia due to Alzheimer's disease    [facility]

## 2019-02-13 NOTE — TELEPHONE ENCOUNTER
Spoke with pt son and MD recommendations given. Pt son verb understanding. Pt son asking for referral to be entered.      Offered to provide Dr Michela Selby office number and pt son states \"I will just look it up myself\"

## 2019-03-01 ENCOUNTER — TELEPHONE (OUTPATIENT)
Dept: INTERNAL MEDICINE CLINIC | Facility: CLINIC | Age: 84
End: 2019-03-01

## 2019-03-15 NOTE — TELEPHONE ENCOUNTER
[address] An[address] is calling  To in form Dr that pt has re started [address] on wed.      Will be faxing order to sign      [phone]

## 2019-03-19 ENCOUNTER — TELEPHONE (OUTPATIENT)
Dept: OTHER | Age: 84
End: 2019-03-19

## 2019-03-19 RX ORDER — ACETAMINOPHEN AND CODEINE PHOSPHATE 300; 30 MG/1; MG/1
1 TABLET ORAL EVERY 4 HOURS PRN
Qty: 30 TABLET | Refills: 2 | OUTPATIENT
Start: 2019-03-19

## 2019-03-19 NOTE — TELEPHONE ENCOUNTER
Patient's son following up on request for pain medication for patient. Advised of the following script sent by Dr Katty Ignacio, son verbalized understanding, Order phoned into [facility].     Acetaminophen-Codeine (TYLENOL WITH CODEINE #3) 300-30 MG

## 2019-03-19 NOTE — TELEPHONE ENCOUNTER
Dr. De La Vega Marking Pt son Donte Walter wanted to let you know that pt was at [facility] ER yesterday. Son stated that pt has dementia and he pulled out his zaman catheter  out so son took him to ER. Son stated that they did not do much in the ER.  They just cleaned him up pu

## 2019-03-20 ENCOUNTER — TELEPHONE (OUTPATIENT)
Dept: INTERNAL MEDICINE CLINIC | Facility: CLINIC | Age: 84
End: 2019-03-20

## 2019-03-20 NOTE — TELEPHONE ENCOUNTER
Spoke with Dudley Harris [phone]. Stated patient was recently in [facility] for dehydration and that the doctors there changed his laxix dose to 20 mg three times weekly. His medications with [facility] are lasix 40 mg daily.   Elidia Griffith stated his edema is i

## 2019-03-20 NOTE — TELEPHONE ENCOUNTER
Per Gabrielle/RN pt has increase his SOB, swelling of his feet and ankle. Transfer call to Dr Bard Chapman.

## 2019-03-20 NOTE — TELEPHONE ENCOUNTER
CSS reached out to [facility] nurse and on call service to have Dr. Serge mazariegos.

## 2019-03-21 ENCOUNTER — TELEPHONE (OUTPATIENT)
Dept: INTERNAL MEDICINE CLINIC | Facility: CLINIC | Age: 84
End: 2019-03-21

## 2019-03-21 NOTE — TELEPHONE ENCOUNTER
Paging    Message # [phone]         03/20/2019 04:23p   [LARRY]  To:  From:  ARABELLA Grimes MD:  Phone#:  ----------------------------------------------------------------------  Gold Kelly, [phone], [facility], [address]

## 2019-03-25 ENCOUNTER — TELEPHONE (OUTPATIENT)
Dept: INTERNAL MEDICINE CLINIC | Facility: CLINIC | Age: 84
End: 2019-03-25

## 2019-03-25 ENCOUNTER — NURSE TRIAGE (OUTPATIENT)
Dept: INTERNAL MEDICINE CLINIC | Facility: CLINIC | Age: 84
End: 2019-03-25

## 2019-03-25 NOTE — TELEPHONE ENCOUNTER
This nurse spoke with patient's son who confirmed the patient is not taking the trazodone and he has been off of the medication since November/2018. Message routed to provider to review and to also see condition update encounter dated 3/25/19.

## 2019-03-25 NOTE — TELEPHONE ENCOUNTER
Reviewed doctor's recommendations with pt's son, Mason Pa. Masno Eli states pt can't walk very well and can't walk down a flight of stairs. Pt will be starting home PT this week, not sure what day. Please advise.

## 2019-03-25 NOTE — TELEPHONE ENCOUNTER
Action Requested: Summary for Provider     []  Critical Lab, Recommendations Needed  [x] Need Additional Advice  []   FYI    []   Need Orders  [x] Need Medications Sent to Pharmacy  []  Other     SUMMARY: Patient's son calling in with concerns of severe in

## 2019-03-25 NOTE — TELEPHONE ENCOUNTER
Patient's son calling with concerns of worsening symptoms of swelling to ankles/lower legs, also causing wheezing.  Was on Furosemide 40mg QD, was hospitalized for kidney infection due to dose being too high, dose was lowered in hospital to 20mg 1 tab Mon,W

## 2019-03-25 NOTE — TELEPHONE ENCOUNTER
Spoke with patient's son who is requesting a new medication that is not Trazodone. Son reports the patient was on the Trazodone before and it did not work. Message routed to Dr. Chavo Qureshi for review.      Please reply to pool: JOSE Cuenca

## 2019-03-25 NOTE — TELEPHONE ENCOUNTER
Reviewed doctor's recommendations with pt's son, Arielle Fontana. States pt is taking Paroxetine, he is asking if pt should be taking the Paroxetine as well as the Trazodone.      Also please advise qty and refills of Trazodone as this is a new medication for pt

## 2019-03-26 RX ORDER — TRAZODONE HYDROCHLORIDE 100 MG/1
100 TABLET ORAL NIGHTLY
Qty: 30 TABLET | Refills: 0 | OUTPATIENT
Start: 2019-03-26 | End: 2019-03-26 | Stop reason: CLARIF

## 2019-03-26 RX ORDER — TRAZODONE HYDROCHLORIDE 100 MG/1
100 TABLET ORAL NIGHTLY
Qty: 30 TABLET | Refills: 0 | Status: SHIPPED | OUTPATIENT
Start: 2019-03-26 | End: 2019-04-22

## 2019-03-26 NOTE — TELEPHONE ENCOUNTER
Followed up with Seth Magana, advised of recommendation to use Trazodone and addition to current meds Paroxetine and Zolpidem, Seth Magana confirms has not tried the combination of the 3 medications, agreed to try Trazodone and continue all other medications.  Script s

## 2019-03-27 ENCOUNTER — TELEPHONE (OUTPATIENT)
Dept: OTHER | Age: 84
End: 2019-03-27

## 2019-03-27 NOTE — TELEPHONE ENCOUNTER
Incoming call from Truman Chau, son of patient. He states trazadone is not at the pharmacy. IG approved 3/26/19. Will call [facility] to confirm receipt. [facility] contacted. RX is ready for . I left message on Ramiro's VM informing him RX ready.

## 2019-03-27 NOTE — TELEPHONE ENCOUNTER
Patient son called on another medication question from previous encounter. I told him to schedule appt for patient.

## 2019-03-27 NOTE — TELEPHONE ENCOUNTER
Pt's son asked about trazodone refill. I verified with [place] in [facility] [place] that the refill is ready for  and I advised the pt's son of this.

## 2019-03-28 ENCOUNTER — TELEPHONE (OUTPATIENT)
Dept: INTERNAL MEDICINE CLINIC | Facility: CLINIC | Age: 84
End: 2019-03-28

## 2019-03-28 NOTE — TELEPHONE ENCOUNTER
Ramandeep Bray called in from [facility] requesting a verbal order to continue skilled speech therapy for 1 more week for 1 time per week.

## 2019-03-29 ENCOUNTER — LAB REQUISITION (OUTPATIENT)
Dept: LAB | Facility: HOSPITAL | Age: 84
End: 2019-03-29
Payer: MEDICARE

## 2019-03-29 DIAGNOSIS — R60.9 EDEMA: ICD-10-CM

## 2019-03-29 PROCEDURE — 80048 BASIC METABOLIC PNL TOTAL CA: CPT

## 2019-03-29 NOTE — TELEPHONE ENCOUNTER
Spoke with SOFIA AVENDANO [facility] nurse and informed of Dr. Derian Pretty note below and she voiced understanding. Topical Steroids Counseling: I discussed with the patient that prolonged use of topical steroids can result in the increased appearance of superficial blood vessels (telangiectasias), lightening (hypopigmentation) and thinning of the skin (atrophy).  Patient understands to avoid using high potency steroids in skin folds, the groin or the face.  The patient verbalized understanding of the proper use and possible adverse effects of topical steroids.  All of the patient's questions and concerns were addressed. Detail Level: Zone

## 2019-04-01 ENCOUNTER — TELEPHONE (OUTPATIENT)
Dept: INTERNAL MEDICINE CLINIC | Facility: CLINIC | Age: 84
End: 2019-04-01

## 2019-04-01 RX ORDER — HALOPERIDOL 0.5 MG/1
0.5 TABLET ORAL 4 TIMES DAILY
Qty: 30 TABLET | Refills: 5 | Status: CANCELLED | OUTPATIENT
Start: 2019-04-01

## 2019-04-01 NOTE — TELEPHONE ENCOUNTER
Ruslan kaplan [facility] ci to advise Dr. Melvin Salazar that     traZODone HCl 100 MG Oral Tab Take 1 tablet (100 mg total) by mouth nightly. Disp: 30 tablet Rfl: 0     Is not working for the patient and patient has not had sleep and has increased agitation and fall risk. Requesting to try Ativan or haldol by mouth to help with sleep.

## 2019-04-01 NOTE — TELEPHONE ENCOUNTER
Call pharmacy and pharmacist stts Haldol 0.5mg is currently on back and not sure of when they will get it  Order. They have Haldol 1mg but it's not scored to cut in half please advise.

## 2019-04-03 RX ORDER — HALOPERIDOL 1 MG/1
1 TABLET ORAL NIGHTLY
Qty: 30 TABLET | Refills: 5 | Status: SHIPPED | OUTPATIENT
Start: 2019-04-03

## 2019-04-04 ENCOUNTER — TELEPHONE (OUTPATIENT)
Dept: INTERNAL MEDICINE CLINIC | Facility: CLINIC | Age: 84
End: 2019-04-04

## 2019-04-16 ENCOUNTER — TELEPHONE (OUTPATIENT)
Dept: INTERNAL MEDICINE CLINIC | Facility: CLINIC | Age: 84
End: 2019-04-16

## 2019-04-16 NOTE — TELEPHONE ENCOUNTER
Brian Cee from [facility] called to inform dr that pt will be seen by speech therapist the week of 4/22.

## 2019-04-24 NOTE — TELEPHONE ENCOUNTER
Please review; protocol failed.     Requested Prescriptions     Pending Prescriptions Disp Refills   • TRAZODONE  MG Oral Tab [Pharmacy Med Name: Trazodone Hydrochloride 100 Mg Tab Zydu] 30 tablet 0     Sig: Take 1 tablet (100 mg total) by mouth jessika

## 2019-04-25 ENCOUNTER — TELEPHONE (OUTPATIENT)
Dept: INTERNAL MEDICINE CLINIC | Facility: CLINIC | Age: 84
End: 2019-04-25

## 2019-04-25 NOTE — TELEPHONE ENCOUNTER
Dakota Human pt's son was bathing pt and lowered him to the ground twice due to weakness fatigue. PT does not complain of injury nor heading of the head.  Please advise

## 2019-04-26 ENCOUNTER — TELEPHONE (OUTPATIENT)
Dept: INTERNAL MEDICINE CLINIC | Facility: CLINIC | Age: 84
End: 2019-04-26

## 2019-04-26 NOTE — TELEPHONE ENCOUNTER
Called pharm informed per supervisor Dr. Christina Thompson is active. They tried again and said they figured it out. No further action needed.

## 2019-04-26 NOTE — TELEPHONE ENCOUNTER
Mahendra from [facility] called and she stated she is trying to fill prescription and the PCP OSMEL is showing inactive.   She stated she has  [id number]    Please call

## 2019-04-27 RX ORDER — TRAZODONE HYDROCHLORIDE 100 MG/1
100 TABLET ORAL NIGHTLY
Qty: 90 TABLET | Refills: 1 | Status: SHIPPED | OUTPATIENT
Start: 2019-04-27

## 2019-04-30 ENCOUNTER — TELEPHONE (OUTPATIENT)
Dept: INTERNAL MEDICINE CLINIC | Facility: CLINIC | Age: 84
End: 2019-04-30

## 2019-05-01 NOTE — TELEPHONE ENCOUNTER
Jostin Venegas routed conversation to [facility] 30 minutes ago (9:28 AM)      Maria Ines Brsawell 32 minutes ago (9:27 AM)         Tried several times to contact pt but no luck, LMTCB also to [facility] pls send ltr to pt. Thank you.          Documentation

## 2019-05-01 NOTE — TELEPHONE ENCOUNTER
Tried several times to contact pt but no luck, LMTCB also to [facility] pls send ltr to pt. Thank you.

## 2019-05-01 NOTE — TELEPHONE ENCOUNTER
Dr. Anamika Mayers- Would you like us to send letter to pt to schedule appt. Per notes below phone room has called and no luck.

## 2019-05-09 ENCOUNTER — TELEPHONE (OUTPATIENT)
Dept: INTERNAL MEDICINE CLINIC | Facility: CLINIC | Age: 84
End: 2019-05-09

## 2019-05-09 NOTE — TELEPHONE ENCOUNTER
[address] nurse would like to inform Dr that pt has has a new Dr that comes to the home. Nurse was wondering if new Dr can Deborra Ale forms?     Please advise

## 2019-05-13 NOTE — TELEPHONE ENCOUNTER
Dr. Manuel Grimm per note below- pt has has a NEW Dr that comes to his home, ok if that New Dr signs hh orders?

## 2019-05-14 NOTE — TELEPHONE ENCOUNTER
for [address] her Dr. Corey More said it's fine with him if Andree Martinez Dr that goes to pt's home signs orders. Call back if you have any questions.

## 2019-06-02 NOTE — TELEPHONE ENCOUNTER
CSS please assist patient in scheduling a follow up appointment - thank you       Please review; protocol failed.     Refill Protocol Appointment Criteria  · Appointment scheduled in the past 6 months or in the next 3 months  Recent Outpatient Visits

## 2019-06-03 NOTE — TELEPHONE ENCOUNTER
See TE Naren Andrew 5/9/19, they may be referring to the doctor with [facility] who sees patient in home.      Dr Zulma Calero please advise on refill request or if this should go to [facility] [place] provider for approval.

## 2019-06-06 RX ORDER — PAROXETINE HYDROCHLORIDE 20 MG/1
20 TABLET, FILM COATED ORAL NIGHTLY
Qty: 90 TABLET | Refills: 1 | Status: SHIPPED | OUTPATIENT
Start: 2019-06-06

## 2019-06-10 ENCOUNTER — LAB REQUISITION (OUTPATIENT)
Dept: LAB | Facility: HOSPITAL | Age: 84
End: 2019-06-10
Payer: MEDICARE

## 2019-06-10 DIAGNOSIS — I50.9 HEART FAILURE (HCC): ICD-10-CM

## 2019-06-10 DIAGNOSIS — I13.0 HYPERTENSIVE HEART AND CHRONIC KIDNEY DISEASE WITH HEART FAILURE AND STAGE 1 THROUGH STAGE 4 CHRONIC KIDNEY DISEASE, OR CHRONIC KIDNEY DISEASE (HCC): ICD-10-CM

## 2019-06-10 PROCEDURE — 80048 BASIC METABOLIC PNL TOTAL CA: CPT | Performed by: INTERNAL MEDICINE

## 2019-06-10 PROCEDURE — 85025 COMPLETE CBC W/AUTO DIFF WBC: CPT | Performed by: INTERNAL MEDICINE

## 2021-03-08 DIAGNOSIS — Z23 NEED FOR VACCINATION: ICD-10-CM

## 2023-07-24 ENCOUNTER — TELEPHONE (OUTPATIENT)
Dept: INTERNAL MEDICINE CLINIC | Facility: CLINIC | Age: 88
End: 2023-07-24

## (undated) NOTE — LETTER
5/3/2019              Noah Brown        [address] [address]         Dear Adiel Castanon,    This letter is to inform you that our office has made several attempts to reach you by phone without success.   We were attempting to con

## (undated) NOTE — Clinical Note
2017          Nicolas Hathaway  :  2/10/1931      To Whom It May Concern: This patient was seen in our office on 2017 . He has dementia, secondary to Alzheimers Disease. He is unable to manage his own finances or affairs.      .    If this o

## (undated) NOTE — Clinical Note
[address] [facility], [address] [place] [address], [address]  [address]  [phone]        Dear TU Stapleton MD,      I had the pleasure of seeing your patient, Osmany Suarez on 3/23/2017.      Below please find a summa aspirin 81 MG Oral Tab Take 1 tablet (81 mg total) by mouth daily.  Disp: 30 tablet Rfl: 3   lisinopril 10 MG Oral Tab TAKE 1 TABLET (10MG)  BY ORAL ROUTE  EVERY DAY Disp: 90 tablet Rfl: 0   Atorvastatin Calcium 20 MG Oral Tab TAKE 1 TABLET (20MG)  BY ORAL HEENT: denies nasal congestion, sinus pain or sore throat; hearing loss negative  RESPIRATORY: denies shortness of breath, wheezing or cough   CARDIOVASCULAR: denies chest pain or RAZA; no palpitations   GI: denies nausea, vomiting, constipation, diarrhea; Coordination: Finger to nose, heel to shin intact bilaterally. Gait: Narrow based, negative Romberg’s sign. Can stand on heels and toes.   Mild difficulty with tandem    ASSESSMENT AND PLAN:     Ramonita Crawford 80year old male presents to clinic with an

## (undated) NOTE — MR AVS SNAPSHOT
[facility]  [address], [address]  [phone]               Thank you for choosing us for your health care visit with Lexi Sam MD, MD.  We are glad to serve you and happy to provide you with this summary of your ? Please allow the office 48-72 hours to fill the prescription. ? Patient must present photo ID at time of .   If a designated family member will be picking up prescription, office must be given name of individual in advance and they must present a acetaminophen 325 MG Tabs   Take 650 mg by mouth every 4 (four) hours as needed for Pain. Commonly known as:  TYLENOL           aspirin 81 MG Tabs   Take 1 tablet (81 mg total) by mouth daily.            Atorvastatin Calcium 20 MG Tabs   TAKE 1 TABLET (2 your Zip Code and Date of Birth to complete the sign-up process. If you do not sign up before the expiration date, you must request a new code.     Your unique [employer] Access Code: [email or web address]  Expires: 5/22/2017  9:26 AM    If you have questions, you can c